# Patient Record
Sex: FEMALE | Employment: UNEMPLOYED | URBAN - METROPOLITAN AREA
[De-identification: names, ages, dates, MRNs, and addresses within clinical notes are randomized per-mention and may not be internally consistent; named-entity substitution may affect disease eponyms.]

---

## 2018-08-02 LAB
CREATININE, EXTERNAL: 0.6
HBA1C MFR BLD HPLC: 4.9 %

## 2022-11-03 ENCOUNTER — OFFICE VISIT (OUTPATIENT)
Dept: INTERNAL MEDICINE CLINIC | Age: 22
End: 2022-11-03
Payer: COMMERCIAL

## 2022-11-03 VITALS
HEIGHT: 61 IN | RESPIRATION RATE: 16 BRPM | DIASTOLIC BLOOD PRESSURE: 50 MMHG | SYSTOLIC BLOOD PRESSURE: 100 MMHG | HEART RATE: 88 BPM | BODY MASS INDEX: 25.79 KG/M2 | WEIGHT: 136.6 LBS | TEMPERATURE: 96.6 F | OXYGEN SATURATION: 99 %

## 2022-11-03 DIAGNOSIS — J45.20 MILD INTERMITTENT ASTHMA WITHOUT COMPLICATION: ICD-10-CM

## 2022-11-03 DIAGNOSIS — F41.9 ANXIETY: ICD-10-CM

## 2022-11-03 DIAGNOSIS — Z23 ENCOUNTER FOR IMMUNIZATION: Primary | ICD-10-CM

## 2022-11-03 DIAGNOSIS — G56.03 BILATERAL CARPAL TUNNEL SYNDROME: ICD-10-CM

## 2022-11-03 DIAGNOSIS — F07.81 POST CONCUSSIVE SYNDROME: ICD-10-CM

## 2022-11-03 PROCEDURE — 90686 IIV4 VACC NO PRSV 0.5 ML IM: CPT | Performed by: INTERNAL MEDICINE

## 2022-11-03 PROCEDURE — 90471 IMMUNIZATION ADMIN: CPT | Performed by: INTERNAL MEDICINE

## 2022-11-03 PROCEDURE — 99203 OFFICE O/P NEW LOW 30 MIN: CPT | Performed by: INTERNAL MEDICINE

## 2022-11-03 RX ORDER — OMEGA-3-ACID ETHYL ESTERS 1 G/1
2 CAPSULE, LIQUID FILLED ORAL 2 TIMES DAILY
COMMUNITY

## 2022-11-03 RX ORDER — CHOLECALCIFEROL (VITAMIN D3) 50 MCG
1 CAPSULE ORAL DAILY
COMMUNITY

## 2022-11-03 RX ORDER — HYDROXYZINE 25 MG/1
25 TABLET, FILM COATED ORAL
Qty: 30 TABLET | Refills: 3 | Status: SHIPPED | OUTPATIENT
Start: 2022-11-03 | End: 2022-11-13

## 2022-11-03 RX ORDER — BISMUTH SUBSALICYLATE 262 MG
1 TABLET,CHEWABLE ORAL 2 TIMES DAILY
COMMUNITY

## 2022-11-03 RX ORDER — VENLAFAXINE 37.5 MG/1
TABLET ORAL DAILY
COMMUNITY
Start: 2022-10-19

## 2022-11-03 RX ORDER — MULTIVIT WITH MINERALS/HERBS
1 TABLET ORAL DAILY
COMMUNITY

## 2022-11-03 RX ORDER — MONTELUKAST SODIUM 10 MG/1
10 TABLET ORAL DAILY
Qty: 30 TABLET | Refills: 3
Start: 2022-11-03

## 2022-11-03 RX ORDER — MAGNESIUM CHLORIDE 70 MG
144 TABLET, DELAYED RELEASE (ENTERIC COATED) ORAL 2 TIMES DAILY
COMMUNITY

## 2022-11-03 NOTE — PROGRESS NOTES
Sharon Turner  is a 23 y.o. female  who present for routine immunizations. Prior to vaccine administration: Consent was obtained. Risks and adverse reactions were discussed. The patient was provided the VIS and they were given an opportunity to ask questions; all questions were addressed. She  denies any symptoms, reactions or allergies that would exclude them from being immunized today. There were no adverse reactions observed post vaccination. Patient was advised to seek medical or call the office with any questions or concerns post vaccination. Patient verbalized understanding.    Nancy Carpio LPN

## 2022-11-03 NOTE — ASSESSMENT & PLAN NOTE
Dx 2021, had steroid shots that helped for a time but then symptoms came back  Established with ortho hand specialist who is recommending surgery  Dr Jn Mckeon

## 2022-11-03 NOTE — ASSESSMENT & PLAN NOTE
Concussion in 2012 and has had lingering effects including headaches  Adding supplements has been helpful  She still gets headaches but less often  Had seen neuro and does not have migraines

## 2022-11-03 NOTE — PROGRESS NOTES
11/3/2022    Amira De La Rosa 2000 is a 23y.o. year old female new patient,   here for evaluation of the following chief complaint(s):  Chief Complaint   Patient presents with    Establish Care           ASSESSMENT/PLAN:  Below is the assessment and plan developed based on review of pertinent history, physical exam, labs, studies, and medications. 1. Post concussive syndrome  Assessment & Plan:  Concussion in 2012 and has had lingering effects including headaches  Adding supplements has been helpful  She still gets headaches but less often  Had seen neuro and does not have migraines  2. Bilateral carpal tunnel syndrome  Assessment & Plan:  Dx 2021, had steroid shots that helped for a time but then symptoms came back  Established with ortho hand specialist who is recommending surgery  Dr Jackline Skiff  3. Anxiety  Assessment & Plan:  Was put on venlafaxine for dual benefit with migraine prevention, hoping to taper off at this time. Feels she has good coping mechanisms for work related stress. Rare use of hydroxyzine, to get to sleep  Discussed plan to taper off venlafaxine slowly, start with 1/2 tab for 2-3 weeks, then quarter or every other day for several weeks  Contact me if any issues with tapering  Orders:  -     hydrOXYzine HCL (ATARAX) 25 mg tablet; Take 1 Tablet by mouth nightly as needed for Sleep for up to 10 days. , Normal, Disp-30 Tablet, R-3  4. Mild intermittent asthma without complication  Assessment & Plan:  Controlled with singulair, doesn't require inhaler  Orders:  -     montelukast (SINGULAIR) 10 mg tablet; Take 1 Tablet by mouth daily. , No Print, Disp-30 Tablet, R-3         Follow-up and Dispositions    Return in about 6 months (around 5/3/2023) for annual physical, or sooner as needed.             SUBJECTIVE/OBJECTIVE:  New patient to me, here to establish care  Visit spent in discussion of past and current medical conditions as documented in assessment and plan         Review of Systems   Constitutional:  Negative for chills and fever. Respiratory:  Negative for cough and shortness of breath. Cardiovascular:  Negative for chest pain, palpitations and leg swelling. Gastrointestinal:  Negative for abdominal pain. Skin:  Negative for rash. Neurological:  Positive for tingling (bilateral CTS) and headaches (chronic, improved from prior). Psychiatric/Behavioral:  The patient is nervous/anxious (mild). Physical Exam  Constitutional:       General: She is not in acute distress. Appearance: Normal appearance. She is not ill-appearing. HENT:      Head: Normocephalic and atraumatic. Eyes:      Conjunctiva/sclera: Conjunctivae normal.   Pulmonary:      Effort: Pulmonary effort is normal.   Skin:     General: Skin is warm and dry. Findings: No rash. Neurological:      General: No focal deficit present. Mental Status: She is alert. Psychiatric:         Mood and Affect: Mood normal.         Behavior: Behavior normal.         Thought Content: Thought content normal.         Judgment: Judgment normal.        Vitals:    11/03/22 1456   BP: (!) 100/50   Pulse: 88   Resp: 16   Temp: (!) 96.6 °F (35.9 °C)   TempSrc: Temporal   SpO2: 99%   Weight: 136 lb 9.6 oz (62 kg)   Height: 5' 1.25\" (1.556 m)        The following sections were reviewed & updated as appropriate: Problem List, Allergies, PMH, PSH, FH, and SH. Patient Active Problem List   Diagnosis Code    Post concussive syndrome F07.81    Anxiety F41.9    Bilateral carpal tunnel syndrome G56.03    Mild intermittent asthma J45.20        Current Outpatient Medications   Medication Sig Dispense Refill    venlafaxine (EFFEXOR) 37.5 mg tablet daily. B.animalis,bifid,infantis,long (Probiotic 4X) 10-15 mg TbEC Take 1 Capsule by mouth daily. b complex vitamins tablet Take 1 Tablet by mouth daily. multivitamin (ONE A DAY) tablet Take 1 Tablet by mouth two (2) times a day.       omega-3 acid ethyl esters (LOVAZA) 1 gram capsule Take 2 g by mouth two (2) times a day. magnesium chloride (MAG DELAY) 64 mg delayed release tablet Take 144 mg by mouth two (2) times a day.      hydrOXYzine HCL (ATARAX) 25 mg tablet Take 1 Tablet by mouth nightly as needed for Sleep for up to 10 days. 30 Tablet 3    montelukast (SINGULAIR) 10 mg tablet Take 1 Tablet by mouth daily. 30 Tablet 3       Patient has no known allergies. Social History     Occupational History    Not on file   Tobacco Use    Smoking status: Never    Smokeless tobacco: Never   Substance and Sexual Activity    Alcohol use: No    Drug use: No    Sexual activity: Never        On this date 11/03/2022 I have spent 30 minutes reviewing previous notes, test results and face to face with the patient discussing the diagnosis and importance of compliance with the treatment plan as well as documenting on the day of the visit. Disclaimer:  Aspects of this note may have been generated using Dragon voice recognition software. Despite editing, there may be some syntax errors   We discussed the expected course, resolution and complications of the diagnosis(es) in detail. I have discussed any significant medication side effects and warnings with the patient when indicated. I advised them to contact the office if their condition worsens, changes or fails to improve as anticipated. Patient expressed understanding of the diagnosis and plan. An electronic signature was used to authenticate this note.   -- Itzel Ceballos MD

## 2022-11-03 NOTE — ASSESSMENT & PLAN NOTE
Was put on venlafaxine for dual benefit with migraine prevention, hoping to taper off at this time. Feels she has good coping mechanisms for work related stress.   Rare use of hydroxyzine, to get to sleep  Discussed plan to taper off venlafaxine slowly, start with 1/2 tab for 2-3 weeks, then quarter or every other day for several weeks  Contact me if any issues with tapering

## 2023-01-10 ENCOUNTER — VIRTUAL VISIT (OUTPATIENT)
Dept: INTERNAL MEDICINE CLINIC | Age: 23
End: 2023-01-10
Payer: COMMERCIAL

## 2023-01-10 DIAGNOSIS — F41.9 ANXIETY: Primary | ICD-10-CM

## 2023-01-10 PROCEDURE — 99214 OFFICE O/P EST MOD 30 MIN: CPT | Performed by: INTERNAL MEDICINE

## 2023-01-10 RX ORDER — BUSPIRONE HYDROCHLORIDE 10 MG/1
10 TABLET ORAL 2 TIMES DAILY
Qty: 60 TABLET | Refills: 2 | Status: SHIPPED | OUTPATIENT
Start: 2023-01-10

## 2023-01-10 RX ORDER — HYDROXYZINE 25 MG/1
TABLET, FILM COATED ORAL
COMMUNITY
Start: 2023-01-07

## 2023-01-10 NOTE — PROGRESS NOTES
1/10/2023    Tod Daughters 2000 is a 23y.o. year old female established patient,   here for video visit to evaluate the following chief complaint(s):  Chief Complaint   Patient presents with    Anxiety             ASSESSMENT/PLAN:  Below is the assessment and plan developed based on review of pertinent history, physical exam, labs, studies, and medications. 1. Anxiety  Assessment & Plan:   Patient with persistent anxiety that is interfering with daily life  We discussed risks and benefits of starting buspirone/Buspar therapy and she wishes to proceed. Potential side effects were discussed  Patient instructed to contact me regarding any adverse side effects, worsening in mood or sleep, or before they plan to discontinue the medication  I recommend looking into concurrent therapy/counseling as well, and I advised the patient to check with their employer or insurance company to see about their mental health benefits or providers in their network. Orders:  -     busPIRone (BUSPAR) 10 mg tablet; Take 1 Tablet by mouth two (2) times a day., Normal, Disp-60 Tablet, R-2       Follow-up and Dispositions    Return in about 4 weeks (around 2/7/2023) for video visit. SUBJECTIVE/OBJECTIVE:  Anxiety    Since last week she has tapered off the venlafaxine. It had caused some nausea and other issues if not taking exactly the same time each day. Her headaches had improved. Was previously on it for mood and also headache management. Now since being off she notes some return of anxiety. She has had more racing thoughts, mood volatility  She had tried zoloft many years ago but didn't tolerate well,wondering about other options    Review of Systems   Constitutional:  Negative for chills and fever. HENT:          Globus sensation she associates with anxiety   Respiratory:  Negative for cough and shortness of breath. Cardiovascular:  Negative for chest pain, palpitations and leg swelling. Gastrointestinal:  Negative for abdominal pain. Skin:  Negative for rash. Psychiatric/Behavioral:  Negative for depression. The patient is nervous/anxious. The patient does not have insomnia. Constitutional: [x] Appears well-developed and well-nourished [x] No apparent distress      [] Abnormal -     Mental status: [x] Alert and awake  [x] Oriented to person/place/time [x] Able to follow commands    [] Abnormal -     Eyes:   EOM    [x]  Normal    [] Abnormal -   Sclera  [x]  Normal    [] Abnormal -          Discharge [x]  None visible   [] Abnormal -     HENT: [x] Normocephalic, atraumatic  [] Abnormal -   [x] Mouth/Throat: Mucous membranes are moist    External Ears [x] Normal  [] Abnormal -    Neck: [x] No visualized mass [] Abnormal -     Pulmonary/Chest: [x] Respiratory effort normal   [x] No visualized signs of difficulty breathing or respiratory distress        [] Abnormal -        Neurological:        [x] No Facial Asymmetry (Cranial nerve 7 motor function) (limited exam due to video visit)          [x] No gaze palsy        [] Abnormal -          Skin:        [x] No significant exanthematous lesions or discoloration noted on facial skin         [] Abnormal -            Psychiatric:       [x] Normal Affect [] Abnormal -              Patient-Reported Vitals 1/10/2023   Patient-Reported Weight 133   Patient-Reported Pulse 90 bpm   Patient-Reported LMP 12/18/2022          The following sections were reviewed & updated as appropriate: Problem List, Allergies, PMH, PSH, FH, and SH. Patient Active Problem List   Diagnosis Code    Post concussive syndrome F07.81    Anxiety F41.9    Bilateral carpal tunnel syndrome G56.03    Mild intermittent asthma J45.20        Current Outpatient Medications   Medication Sig Dispense Refill    hydrOXYzine HCL (ATARAX) 25 mg tablet daily as needed. busPIRone (BUSPAR) 10 mg tablet Take 1 Tablet by mouth two (2) times a day.  60 Tablet 2 B.animalis,bifid,infantis,long (Probiotic 4X) 10-15 mg TbEC Take 1 Capsule by mouth daily. b complex vitamins tablet Take 1 Tablet by mouth daily. multivitamin (ONE A DAY) tablet Take 1 Tablet by mouth two (2) times a day. omega-3 acid ethyl esters (LOVAZA) 1 gram capsule Take 2 g by mouth two (2) times a day. magnesium chloride (MAG DELAY) 64 mg delayed release tablet Take 144 mg by mouth two (2) times a day. montelukast (SINGULAIR) 10 mg tablet Take 1 Tablet by mouth daily. 30 Tablet 3    INTRAUTERINE DEVICE, IUD, IU by IntraUTERine route. Patient has no known allergies. Social History     Occupational History    Not on file   Tobacco Use    Smoking status: Never    Smokeless tobacco: Never   Substance and Sexual Activity    Alcohol use: No    Drug use: No    Sexual activity: Never                Time spent on telemed visit: 20 min    The patient was evaluated through a synchronous (real-time) audio-video encounter. The patient (or guardian if applicable) is aware that this is a billable service, which includes applicable co-pays. Verbal consent to proceed has been obtained. The visit was conducted pursuant to the emergency declaration under the [facility], [address] [place] [address] authority and the [place] and [place] General Act. Patient identification was verified, and a caregiver was present when appropriate. The patient was located at home in a state where the provider was licensed to provide care. Disclaimer:  Aspects of this note may have been generated using Dragon voice recognition software. Despite editing, there may be some syntax errors   We discussed the expected course, resolution and complications of the diagnosis(es) in detail. I have discussed any significant medication side effects and warnings with the patient when indicated.   I advised them to contact the office if their condition worsens, changes or fails to improve as anticipated. Patient expressed understanding of the diagnosis and plan. An electronic signature was used to authenticate this note.   -- Chiquita Cabrera MD

## 2023-01-10 NOTE — ASSESSMENT & PLAN NOTE
Patient with persistent anxiety that is interfering with daily life  We discussed risks and benefits of starting buspirone/Buspar therapy and she wishes to proceed. Potential side effects were discussed  Patient instructed to contact me regarding any adverse side effects, worsening in mood or sleep, or before they plan to discontinue the medication  I recommend looking into concurrent therapy/counseling as well, and I advised the patient to check with their employer or insurance company to see about their mental health benefits or providers in their network.

## 2023-02-07 ENCOUNTER — VIRTUAL VISIT (OUTPATIENT)
Dept: INTERNAL MEDICINE CLINIC | Age: 23
End: 2023-02-07
Payer: COMMERCIAL

## 2023-02-07 DIAGNOSIS — F41.9 ANXIETY: ICD-10-CM

## 2023-02-07 PROCEDURE — 99213 OFFICE O/P EST LOW 20 MIN: CPT | Performed by: INTERNAL MEDICINE

## 2023-02-07 NOTE — PROGRESS NOTES
2/7/2023    Toy Medal 2000 is a 20y.o. year old female established patient,   here for video visit to evaluate the following chief complaint(s):  Chief Complaint   Patient presents with    Anxiety           ASSESSMENT/PLAN:  Below is the assessment and plan developed based on review of pertinent history, physical exam, labs, studies, and medications. 1. Anxiety  Assessment & Plan:  Kristin is working well for anxiety symptoms and no significant adverse effects noted, will continue  Encouraged her to keep looking into therapy/counseling options  Advised on not discontinuing meds without discussing with me  Contact me if any significant worsening in mood or sleep       Follow-up and Dispositions    Return in about 4 months (around 6/7/2023) for annual physical, or sooner as needed. SUBJECTIVE/OBJECTIVE:  Anxiety follow up  Previously discontinued venlafaxine and then started on buspar last month  Overall feels buspar is well tolerated and working well to manage anxiety symptoms  No obvious adverse effects though feels like sleep is somewhat different in that she will wake up more often in the night as compared to before. She is able to get back to sleep and feels rested in the morning      Review of Systems   Constitutional:  Negative for chills and fever. Respiratory:  Negative for cough and shortness of breath. Cardiovascular:  Negative for chest pain, palpitations and leg swelling. Gastrointestinal:  Negative for abdominal pain. Skin:  Negative for rash. Psychiatric/Behavioral:  Negative for depression and suicidal ideas. The patient is not nervous/anxious.          Constitutional: [x] Appears well-developed and well-nourished [x] No apparent distress      [] Abnormal -     Mental status: [x] Alert and awake  [x] Oriented to person/place/time [x] Able to follow commands    [] Abnormal -     Eyes:   EOM    [x]  Normal    [] Abnormal -   Sclera  [x]  Normal    [] Abnormal - Discharge [x]  None visible   [] Abnormal -     HENT: [x] Normocephalic, atraumatic  [] Abnormal -   [x] Mouth/Throat: Mucous membranes are moist    External Ears [x] Normal  [] Abnormal -    Neck: [x] No visualized mass [] Abnormal -     Pulmonary/Chest: [x] Respiratory effort normal   [x] No visualized signs of difficulty breathing or respiratory distress        [] Abnormal -       Neurological:        [x] No Facial Asymmetry (Cranial nerve 7 motor function) (limited exam due to video visit)          [x] No gaze palsy        [] Abnormal -          Skin:        [x] No significant exanthematous lesions or discoloration noted on facial skin         [] Abnormal -            Psychiatric:       [x] Normal Affect [] Abnormal -              Patient-Reported Vitals 2/7/2023 1/10/2023   Patient-Reported Weight 132 133   Patient-Reported Pulse 75 90 bpm   Patient-Reported LMP 01/17/2023 12/18/2022          The following sections were reviewed & updated as appropriate: Problem List, Allergies, PMH, PSH, FH, and SH. Patient Active Problem List   Diagnosis Code    Post concussive syndrome F07.81    Anxiety F41.9    Bilateral carpal tunnel syndrome G56.03    Mild intermittent asthma J45.20        Current Outpatient Medications   Medication Sig Dispense Refill    hydrOXYzine HCL (ATARAX) 25 mg tablet Take 1 Tablet by mouth daily as needed for Anxiety or Sleep. 30 Tablet 0    busPIRone (BUSPAR) 10 mg tablet Take 1 Tablet by mouth two (2) times a day. 60 Tablet 2    B.animalis,bifid,infantis,long (Probiotic 4X) 10-15 mg TbEC Take 1 Capsule by mouth daily. b complex vitamins tablet Take 1 Tablet by mouth daily. multivitamin (ONE A DAY) tablet Take 1 Tablet by mouth two (2) times a day. omega-3 acid ethyl esters (LOVAZA) 1 gram capsule Take 2 g by mouth two (2) times a day. magnesium chloride (MAG DELAY) 64 mg delayed release tablet Take 144 mg by mouth two (2) times a day.       montelukast (SINGULAIR) 10 mg tablet Take 1 Tablet by mouth daily. 30 Tablet 3    INTRAUTERINE DEVICE, IUD, IU by IntraUTERine route. Patient has no known allergies. Social History     Occupational History    Not on file   Tobacco Use    Smoking status: Never    Smokeless tobacco: Never   Substance and Sexual Activity    Alcohol use: No    Drug use: No    Sexual activity: Never            Time spent on telemed visit: 12 min    The patient was evaluated through a synchronous (real-time) audio-video encounter. The patient (or guardian if applicable) is aware that this is a billable service, which includes applicable co-pays. Verbal consent to proceed has been obtained. The visit was conducted pursuant to the emergency declaration under the [facility], [facility] authority and the [place] and [employer] General Act. Patient identification was verified, and a caregiver was present when appropriate. The patient was located at home in a state where the provider was licensed to provide care. Disclaimer:  Aspects of this note may have been generated using Dragon voice recognition software. Despite editing, there may be some syntax errors   We discussed the expected course, resolution and complications of the diagnosis(es) in detail. I have discussed any significant medication side effects and warnings with the patient when indicated. I advised them to contact the office if their condition worsens, changes or fails to improve as anticipated. Patient expressed understanding of the diagnosis and plan. An electronic signature was used to authenticate this note.   -- Kirstin Patel MD

## 2023-02-07 NOTE — ASSESSMENT & PLAN NOTE
Buspar is working well for anxiety symptoms and no significant adverse effects noted, will continue  Encouraged her to keep looking into therapy/counseling options  Advised on not discontinuing meds without discussing with me  Contact me if any significant worsening in mood or sleep

## 2023-03-19 ENCOUNTER — PATIENT MESSAGE (OUTPATIENT)
Dept: INTERNAL MEDICINE CLINIC | Age: 23
End: 2023-03-19

## 2023-03-19 DIAGNOSIS — J45.20 MILD INTERMITTENT ASTHMA WITHOUT COMPLICATION: ICD-10-CM

## 2023-03-19 DIAGNOSIS — F41.9 ANXIETY: ICD-10-CM

## 2023-03-21 RX ORDER — MONTELUKAST SODIUM 10 MG/1
10 TABLET ORAL DAILY
Qty: 90 TABLET | Refills: 3 | Status: SHIPPED | OUTPATIENT
Start: 2023-03-21

## 2023-03-21 RX ORDER — BUSPIRONE HYDROCHLORIDE 10 MG/1
10 TABLET ORAL 2 TIMES DAILY
Qty: 60 TABLET | Refills: 2 | Status: SHIPPED | OUTPATIENT
Start: 2023-03-21

## 2023-05-17 RX ORDER — HYDROXYZINE HYDROCHLORIDE 25 MG/1
TABLET, FILM COATED ORAL
COMMUNITY
Start: 2023-04-04

## 2023-05-17 RX ORDER — MAGNESIUM CHLORIDE 64 MG
144 TABLET, DELAYED RELEASE (ENTERIC COATED) ORAL 2 TIMES DAILY
COMMUNITY

## 2023-05-17 RX ORDER — BUSPIRONE HYDROCHLORIDE 10 MG/1
10 TABLET ORAL 2 TIMES DAILY
COMMUNITY
Start: 2023-03-21 | End: 2023-06-23 | Stop reason: SDUPTHER

## 2023-05-17 RX ORDER — CHLORAL HYDRATE 500 MG
2 CAPSULE ORAL 2 TIMES DAILY
COMMUNITY

## 2023-05-17 RX ORDER — MONTELUKAST SODIUM 10 MG/1
10 TABLET ORAL DAILY
COMMUNITY
Start: 2023-03-21

## 2023-06-23 ENCOUNTER — OFFICE VISIT (OUTPATIENT)
Age: 23
End: 2023-06-23
Payer: COMMERCIAL

## 2023-06-23 VITALS
WEIGHT: 131.4 LBS | HEIGHT: 61 IN | SYSTOLIC BLOOD PRESSURE: 114 MMHG | HEART RATE: 98 BPM | BODY MASS INDEX: 24.81 KG/M2 | OXYGEN SATURATION: 100 % | TEMPERATURE: 97.8 F | DIASTOLIC BLOOD PRESSURE: 76 MMHG | RESPIRATION RATE: 18 BRPM

## 2023-06-23 DIAGNOSIS — G56.03 BILATERAL CARPAL TUNNEL SYNDROME: ICD-10-CM

## 2023-06-23 DIAGNOSIS — F41.9 ANXIETY: ICD-10-CM

## 2023-06-23 DIAGNOSIS — Z11.59 NEED FOR HEPATITIS C SCREENING TEST: ICD-10-CM

## 2023-06-23 DIAGNOSIS — E55.9 VITAMIN D DEFICIENCY: ICD-10-CM

## 2023-06-23 DIAGNOSIS — Z00.00 ROUTINE PHYSICAL EXAMINATION: Primary | ICD-10-CM

## 2023-06-23 DIAGNOSIS — Z23 NEED FOR TDAP VACCINATION: ICD-10-CM

## 2023-06-23 DIAGNOSIS — Z11.3 SCREENING FOR STD (SEXUALLY TRANSMITTED DISEASE): ICD-10-CM

## 2023-06-23 PROCEDURE — 90471 IMMUNIZATION ADMIN: CPT | Performed by: INTERNAL MEDICINE

## 2023-06-23 PROCEDURE — 90715 TDAP VACCINE 7 YRS/> IM: CPT | Performed by: INTERNAL MEDICINE

## 2023-06-23 PROCEDURE — 99395 PREV VISIT EST AGE 18-39: CPT | Performed by: INTERNAL MEDICINE

## 2023-06-23 RX ORDER — BUSPIRONE HYDROCHLORIDE 10 MG/1
10 TABLET ORAL 2 TIMES DAILY
Qty: 180 TABLET | Refills: 1 | Status: SHIPPED | OUTPATIENT
Start: 2023-06-23

## 2023-06-23 SDOH — ECONOMIC STABILITY: FOOD INSECURITY: WITHIN THE PAST 12 MONTHS, THE FOOD YOU BOUGHT JUST DIDN'T LAST AND YOU DIDN'T HAVE MONEY TO GET MORE.: NEVER TRUE

## 2023-06-23 SDOH — ECONOMIC STABILITY: HOUSING INSECURITY
IN THE LAST 12 MONTHS, WAS THERE A TIME WHEN YOU DID NOT HAVE A STEADY PLACE TO SLEEP OR SLEPT IN A SHELTER (INCLUDING NOW)?: NO

## 2023-06-23 SDOH — ECONOMIC STABILITY: INCOME INSECURITY: HOW HARD IS IT FOR YOU TO PAY FOR THE VERY BASICS LIKE FOOD, HOUSING, MEDICAL CARE, AND HEATING?: NOT HARD AT ALL

## 2023-06-23 SDOH — ECONOMIC STABILITY: FOOD INSECURITY: WITHIN THE PAST 12 MONTHS, YOU WORRIED THAT YOUR FOOD WOULD RUN OUT BEFORE YOU GOT MONEY TO BUY MORE.: NEVER TRUE

## 2023-06-23 ASSESSMENT — ENCOUNTER SYMPTOMS
CONSTIPATION: 0
VOMITING: 0
DIARRHEA: 0
EYES NEGATIVE: 1
NAUSEA: 0
COUGH: 0
SHORTNESS OF BREATH: 0
ABDOMINAL PAIN: 0

## 2023-06-23 ASSESSMENT — PATIENT HEALTH QUESTIONNAIRE - PHQ9
SUM OF ALL RESPONSES TO PHQ QUESTIONS 1-9: 0
SUM OF ALL RESPONSES TO PHQ9 QUESTIONS 1 & 2: 0
2. FEELING DOWN, DEPRESSED OR HOPELESS: 0
SUM OF ALL RESPONSES TO PHQ QUESTIONS 1-9: 0
1. LITTLE INTEREST OR PLEASURE IN DOING THINGS: 0
SUM OF ALL RESPONSES TO PHQ QUESTIONS 1-9: 0
SUM OF ALL RESPONSES TO PHQ QUESTIONS 1-9: 0

## 2023-06-23 NOTE — PROGRESS NOTES
Chief Complaint   Patient presents with    Annual Exam     Blood pressure 114/76, pulse 98, temperature 97.8 °F (36.6 °C), temperature source Temporal, resp. rate 18, height 5' 1.3\" (1.557 m), weight 131 lb 6.4 oz (59.6 kg), last menstrual period 06/16/2023, SpO2 100 %.

## 2023-06-23 NOTE — ASSESSMENT & PLAN NOTE
Had surgery 2022  Dr Margarito Santiago  Subsequently developed some right shoulder aches, working with a chiropractor now

## 2023-06-23 NOTE — PROGRESS NOTES
6/23/2023    Maddie Casanova is a 20 y.o. female who was seen in clinic today for a full physical.             Assessment & Plan:   Below is the assessment and plan developed based on review of pertinent history, physical exam, labs, studies, and medications. 1. Routine physical examination  -     Lipid Panel; Future  -     TSH; Future  -     Comprehensive Metabolic Panel; Future  -     CBC with Auto Differential; Future  2. Bilateral carpal tunnel syndrome  Assessment & Plan:  Had surgery 2022  Dr Miquel Barry  Subsequently developed some right shoulder aches, working with a chiropractor now  3. Anxiety  Assessment & Plan:   Mood symptoms controlled on current regimen and no adverse effects noted, plan to continue   Orders:  -     busPIRone (BUSPAR) 10 MG tablet; Take 1 tablet by mouth 2 times daily, Disp-180 tablet, R-1Normal  4. Vitamin D deficiency  -     Vitamin D 25 Hydroxy; Future  5. Need for hepatitis C screening test  -     Hepatitis C Ab, Rflx to Qt by PCR; Future  6. Screening for STD (sexually transmitted disease)  -     HIV 1/2 Ag/Ab, 4TH Generation,W Rflx Confirm; Future  7. Need for Tdap vaccination  -     Tdap, BOOSTRIX, (age 8 yrs+), IM       Return in about 6 months (around 12/23/2023) for chronic follow up.       Galileo Helms is here today for a full physical.        Diet and exercise habits: balanced diet, goes walking daily      Depression Screen:  PHQ-9 Total Score: 0 (6/23/2023  2:51 PM)       Health Maintenance:     Health Maintenance   Topic Date Due    COVID-19 Vaccine (1) Never done    Pneumococcal 0-64 years Vaccine (1 - PCV) Never done    HIV screen  Never done    Hepatitis C screen  Never done    DTaP/Tdap/Td vaccine (7 - Td or Tdap) 08/17/2019    HPV vaccine (2 - 3-dose series) 04/08/2022    Depression Screen  06/23/2024    Pap smear  09/15/2024    Hepatitis A vaccine  Completed    Hib vaccine  Completed    Varicella vaccine  Completed    Flu vaccine  Completed    Meningococcal

## 2023-07-06 DIAGNOSIS — E55.9 VITAMIN D DEFICIENCY: ICD-10-CM

## 2023-07-06 DIAGNOSIS — Z11.3 SCREENING FOR STD (SEXUALLY TRANSMITTED DISEASE): ICD-10-CM

## 2023-07-06 DIAGNOSIS — Z00.00 ROUTINE PHYSICAL EXAMINATION: ICD-10-CM

## 2023-07-06 DIAGNOSIS — Z11.59 NEED FOR HEPATITIS C SCREENING TEST: ICD-10-CM

## 2023-07-06 LAB
25(OH)D3 SERPL-MCNC: 36.3 NG/ML (ref 30–100)
ALBUMIN SERPL-MCNC: 4.2 G/DL (ref 3.5–5)
ALBUMIN/GLOB SERPL: 1.5 (ref 1.1–2.2)
ALP SERPL-CCNC: 60 U/L (ref 45–117)
ALT SERPL-CCNC: 22 U/L (ref 12–78)
ANION GAP SERPL CALC-SCNC: 5 MMOL/L (ref 5–15)
AST SERPL-CCNC: 15 U/L (ref 15–37)
BASOPHILS # BLD: 0.1 K/UL (ref 0–0.1)
BASOPHILS NFR BLD: 1 % (ref 0–1)
BILIRUB SERPL-MCNC: 0.7 MG/DL (ref 0.2–1)
BUN SERPL-MCNC: 11 MG/DL (ref 6–20)
BUN/CREAT SERPL: 15 (ref 12–20)
CALCIUM SERPL-MCNC: 9.3 MG/DL (ref 8.5–10.1)
CHLORIDE SERPL-SCNC: 106 MMOL/L (ref 97–108)
CHOLEST SERPL-MCNC: 132 MG/DL
CO2 SERPL-SCNC: 28 MMOL/L (ref 21–32)
CREAT SERPL-MCNC: 0.73 MG/DL (ref 0.55–1.02)
DIFFERENTIAL METHOD BLD: NORMAL
EOSINOPHIL # BLD: 0.1 K/UL (ref 0–0.4)
EOSINOPHIL NFR BLD: 2 % (ref 0–7)
ERYTHROCYTE [DISTWIDTH] IN BLOOD BY AUTOMATED COUNT: 12.9 % (ref 11.5–14.5)
GLOBULIN SER CALC-MCNC: 2.8 G/DL (ref 2–4)
GLUCOSE SERPL-MCNC: 96 MG/DL (ref 65–100)
HCT VFR BLD AUTO: 43.6 % (ref 35–47)
HDLC SERPL-MCNC: 49 MG/DL
HDLC SERPL: 2.7 (ref 0–5)
HGB BLD-MCNC: 14 G/DL (ref 11.5–16)
HIV 1+2 AB+HIV1 P24 AG SERPL QL IA: NONREACTIVE
HIV 1/2 RESULT COMMENT: NORMAL
IMM GRANULOCYTES # BLD AUTO: 0 K/UL
IMM GRANULOCYTES NFR BLD AUTO: 0 %
LDLC SERPL CALC-MCNC: 70.2 MG/DL (ref 0–100)
LYMPHOCYTES # BLD: 1.7 K/UL (ref 0.8–3.5)
LYMPHOCYTES NFR BLD: 33 % (ref 12–49)
MCH RBC QN AUTO: 30 PG (ref 26–34)
MCHC RBC AUTO-ENTMCNC: 32.1 G/DL (ref 30–36.5)
MCV RBC AUTO: 93.6 FL (ref 80–99)
MONOCYTES # BLD: 0.4 K/UL (ref 0–1)
MONOCYTES NFR BLD: 7 % (ref 5–13)
NEUTS SEG # BLD: 2.9 K/UL (ref 1.8–8)
NEUTS SEG NFR BLD: 57 % (ref 32–75)
NRBC # BLD: 0 K/UL (ref 0–0.01)
NRBC BLD-RTO: 0 PER 100 WBC
PLATELET # BLD AUTO: 233 K/UL (ref 150–400)
PMV BLD AUTO: 11.7 FL (ref 8.9–12.9)
POTASSIUM SERPL-SCNC: 4.3 MMOL/L (ref 3.5–5.1)
PROT SERPL-MCNC: 7 G/DL (ref 6.4–8.2)
RBC # BLD AUTO: 4.66 M/UL (ref 3.8–5.2)
RBC MORPH BLD: NORMAL
SODIUM SERPL-SCNC: 139 MMOL/L (ref 136–145)
TRIGL SERPL-MCNC: 64 MG/DL
TSH SERPL DL<=0.05 MIU/L-ACNC: 1.21 UIU/ML (ref 0.36–3.74)
VLDLC SERPL CALC-MCNC: 12.8 MG/DL
WBC # BLD AUTO: 5.2 K/UL (ref 3.6–11)

## 2023-07-07 LAB
HCV AB SERPL QL IA: NORMAL
HCV IGG SERPL QL IA: NON REACTIVE S/CO RATIO

## 2023-10-01 ENCOUNTER — PATIENT MESSAGE (OUTPATIENT)
Age: 23
End: 2023-10-01

## 2023-10-01 DIAGNOSIS — F41.9 ANXIETY: Primary | ICD-10-CM

## 2023-10-02 RX ORDER — HYDROXYZINE HYDROCHLORIDE 25 MG/1
25 TABLET, FILM COATED ORAL EVERY 8 HOURS PRN
Qty: 30 TABLET | Refills: 1 | Status: SHIPPED | OUTPATIENT
Start: 2023-10-02

## 2023-10-02 NOTE — TELEPHONE ENCOUNTER
From: Vincent Howard  To: Dr. Jessica Sanderson: 10/1/2023 9:27 PM EDT  Subject: Hydroxyzine     Hi Dr. Lico Freire, I noticed I'm running low on my 25mg hydroxyzine that I use as needed. Is it possible to have a refill scheduled? Thank you!

## 2023-10-27 ENCOUNTER — OFFICE VISIT (OUTPATIENT)
Age: 23
End: 2023-10-27

## 2023-10-27 VITALS
TEMPERATURE: 97.8 F | WEIGHT: 129.4 LBS | SYSTOLIC BLOOD PRESSURE: 105 MMHG | HEIGHT: 61 IN | OXYGEN SATURATION: 99 % | BODY MASS INDEX: 24.43 KG/M2 | HEART RATE: 69 BPM | DIASTOLIC BLOOD PRESSURE: 72 MMHG | RESPIRATION RATE: 16 BRPM

## 2023-10-27 DIAGNOSIS — R39.89 BLADDER PAIN: Primary | ICD-10-CM

## 2023-10-27 DIAGNOSIS — F41.9 ANXIETY: ICD-10-CM

## 2023-10-27 DIAGNOSIS — R39.89 BLADDER PAIN: ICD-10-CM

## 2023-10-27 PROBLEM — J45.20 MILD INTERMITTENT ASTHMA: Status: ACTIVE | Noted: 2022-11-03

## 2023-10-27 LAB
BILIRUBIN, URINE, POC: NEGATIVE
BLOOD URINE, POC: NORMAL
GLUCOSE URINE, POC: NEGATIVE
KETONES, URINE, POC: NEGATIVE
LEUKOCYTE ESTERASE, URINE, POC: NORMAL
NITRITE, URINE, POC: NEGATIVE
PH, URINE, POC: 6.5 (ref 4.6–8)
PROTEIN,URINE, POC: NEGATIVE
SPECIFIC GRAVITY, URINE, POC: 1.01 (ref 1–1.03)
URINALYSIS CLARITY, POC: CLEAR
URINALYSIS COLOR, POC: NORMAL
UROBILINOGEN, POC: NORMAL

## 2023-10-27 RX ORDER — BUSPIRONE HYDROCHLORIDE 15 MG/1
15 TABLET ORAL 2 TIMES DAILY
Qty: 180 TABLET | Refills: 1 | Status: SHIPPED | OUTPATIENT
Start: 2023-10-27

## 2023-10-27 ASSESSMENT — PATIENT HEALTH QUESTIONNAIRE - PHQ9
SUM OF ALL RESPONSES TO PHQ QUESTIONS 1-9: 2
1. LITTLE INTEREST OR PLEASURE IN DOING THINGS: 1
SUM OF ALL RESPONSES TO PHQ9 QUESTIONS 1 & 2: 2
SUM OF ALL RESPONSES TO PHQ QUESTIONS 1-9: 2
2. FEELING DOWN, DEPRESSED OR HOPELESS: 1

## 2023-10-27 ASSESSMENT — ENCOUNTER SYMPTOMS
COUGH: 0
SHORTNESS OF BREATH: 0
NAUSEA: 0

## 2023-10-27 NOTE — PROGRESS NOTES
10/27/2023    Socorro Vega 2000 is a 20y.o. year old female established patient,   here for evaluation of the following chief complaint(s):  Chief Complaint   Patient presents with    Pelvic Pain           ASSESSMENT/PLAN:  Below is the assessment and plan developed based on review of pertinent history, physical exam, labs, studies, and medications. 1. Bladder pain  -     Culture, Urine; Future  -     AMB POC URINALYSIS DIP STICK AUTO W/ MICRO  2. Anxiety  Assessment & Plan:   Doing well with 15mg BID, will continue  Orders:  -     busPIRone (BUSPAR) 15 MG tablet; Take 15 mg by mouth 2 times daily, Disp-180 tablet, R-1Normal     UA dipstick today: 1+ blood and trace leuks, no nitrate  Poss infection vs IC vs stone  Advised on drinking lots of water, monitoring urine  Current symptoms are not acute so will await culture before starting antibiotics but advised on contacting us if any worsening symptoms over the weekend  Nathanelda Marcin to try Azo, cranberry/probitoic supplements  If culture negative, will repeat UA next week  She is not on her period so should not expect blood as contaminant    Return if symptoms worsen or fail to improve. SUBJECTIVE/OBJECTIVE:  About 2 months ago patient started having some pelvic pain, urinary frequency, discharge. Saw obgyn x 2 and had testing but it was OK  She still has urinary frequency over the past month, waking up at night and sometimes lower pelvic pain. Has some low level bladder pressure constantly. Denies constipation  Has cut back on alcohol. Cant think of any exacerbating factors to when this started    Review of Systems   Constitutional:  Negative for chills and fever. Respiratory:  Negative for cough and shortness of breath. Cardiovascular:  Negative for chest pain. Gastrointestinal:  Negative for nausea. Genitourinary:  Positive for frequency and pelvic pain. Negative for hematuria. Skin:  Negative for rash.           Physical Exam  Constitutional:

## 2023-10-28 LAB
BACTERIA SPEC CULT: NORMAL
SERVICE CMNT-IMP: NORMAL

## 2023-10-30 DIAGNOSIS — R31.29 MICROSCOPIC HEMATURIA: Primary | ICD-10-CM

## 2023-11-02 DIAGNOSIS — R31.29 MICROSCOPIC HEMATURIA: ICD-10-CM

## 2023-11-02 LAB — SPECIMEN HOLD: NORMAL

## 2023-11-03 LAB
APPEARANCE UR: CLEAR
BACTERIA URNS QL MICRO: NEGATIVE /HPF
BILIRUB UR QL: NEGATIVE
COLOR UR: NORMAL
EPITH CASTS URNS QL MICRO: NORMAL /LPF
GLUCOSE UR STRIP.AUTO-MCNC: NEGATIVE MG/DL
HGB UR QL STRIP: NEGATIVE
HYALINE CASTS URNS QL MICRO: NORMAL /LPF (ref 0–5)
KETONES UR QL STRIP.AUTO: NEGATIVE MG/DL
LEUKOCYTE ESTERASE UR QL STRIP.AUTO: NEGATIVE
NITRITE UR QL STRIP.AUTO: NEGATIVE
PH UR STRIP: 7.5 (ref 5–8)
PROT UR STRIP-MCNC: NEGATIVE MG/DL
RBC #/AREA URNS HPF: NORMAL /HPF (ref 0–5)
SP GR UR REFRACTOMETRY: 1.01 (ref 1–1.03)
UROBILINOGEN UR QL STRIP.AUTO: 0.2 EU/DL (ref 0.2–1)
WBC URNS QL MICRO: NORMAL /HPF (ref 0–4)

## 2024-01-16 ENCOUNTER — TELEMEDICINE (OUTPATIENT)
Age: 24
End: 2024-01-16
Payer: COMMERCIAL

## 2024-01-16 DIAGNOSIS — F41.9 ANXIETY: ICD-10-CM

## 2024-01-16 DIAGNOSIS — J45.20 MILD INTERMITTENT ASTHMA WITHOUT COMPLICATION: ICD-10-CM

## 2024-01-16 PROCEDURE — 99214 OFFICE O/P EST MOD 30 MIN: CPT | Performed by: INTERNAL MEDICINE

## 2024-01-16 RX ORDER — BUPROPION HYDROCHLORIDE 150 MG/1
150 TABLET ORAL EVERY MORNING
Qty: 30 TABLET | Refills: 1 | Status: SHIPPED | OUTPATIENT
Start: 2024-01-16

## 2024-01-16 RX ORDER — HYDROXYZINE HYDROCHLORIDE 25 MG/1
25 TABLET, FILM COATED ORAL EVERY 8 HOURS PRN
Qty: 30 TABLET | Refills: 1 | Status: SHIPPED | OUTPATIENT
Start: 2024-01-16

## 2024-01-16 RX ORDER — MONTELUKAST SODIUM 5 MG/1
5 TABLET, CHEWABLE ORAL NIGHTLY
Qty: 90 TABLET | Refills: 1 | Status: SHIPPED | OUTPATIENT
Start: 2024-01-16

## 2024-01-16 ASSESSMENT — ENCOUNTER SYMPTOMS
SHORTNESS OF BREATH: 0
COUGH: 0
ABDOMINAL PAIN: 0

## 2024-01-16 NOTE — ASSESSMENT & PLAN NOTE
Suggested addition of wellbutrin along with buspar to address recent symptoms  New med dosing and potential side effects discussed  Follow up with me via mychart with progress in a few weeks

## 2024-01-16 NOTE — ASSESSMENT & PLAN NOTE
Discussed possible mood side effects with singulair, she is willing to try 5mg dose and see if that still controls symptoms

## 2024-01-16 NOTE — PROGRESS NOTES
1/16/2024    Traci Moore 2000 is a 23 y.o. year old female established patient,   here for video visit to evaluate the following chief complaint(s):  Chief Complaint   Patient presents with    Anxiety           ASSESSMENT/PLAN:  Below is the assessment and plan developed based on review of pertinent history, physical exam, labs, studies, and medications.    1. Mild intermittent asthma without complication  Assessment & Plan:   Discussed possible mood side effects with singulair, she is willing to try 5mg dose and see if that still controls symptoms  Orders:  -     montelukast (SINGULAIR) 5 MG chewable tablet; Take 1 tablet by mouth nightly, Disp-90 tablet, R-1Normal  2. Anxiety  Assessment & Plan:   Suggested addition of wellbutrin along with buspar to address recent symptoms  New med dosing and potential side effects discussed  Follow up with me via [facility] with progress in a few weeks  Orders:  -     buPROPion (WELLBUTRIN XL) 150 MG extended release tablet; Take 1 tablet by mouth every morning, Disp-30 tablet, R-1Normal  -     hydrOXYzine HCl (ATARAX) 25 MG tablet; Take 1 tablet by mouth every 8 hours as needed for Anxiety, Disp-30 tablet, R-1Normal         Return in about 4 months (around 5/16/2024) for annual physical,or sooner as needed.       SUBJECTIVE/OBJECTIVE:  Patient wondering about making a change with her anxiety meds  She has been on buspar 15mg BID but she is still getting breakthrough anxiety  Work things will get stuck on her mind, she will have trouble concentrating  Sometimes feeling overwhelmed with her responsibilities and then ends up not getting work done she needs to do  Has hydroxyzine she will take sparingly      Review of Systems   Constitutional:  Negative for chills and fever.   Respiratory:  Negative for cough and shortness of breath.    Cardiovascular:  Negative for chest pain, palpitations and leg swelling.   Gastrointestinal:  Negative for abdominal pain.

## 2024-02-06 ENCOUNTER — PATIENT MESSAGE (OUTPATIENT)
Age: 24
End: 2024-02-06

## 2024-02-06 DIAGNOSIS — F41.9 ANXIETY: ICD-10-CM

## 2024-02-06 RX ORDER — BUSPIRONE HYDROCHLORIDE 15 MG/1
15 TABLET ORAL 2 TIMES DAILY
Qty: 180 TABLET | Refills: 1 | Status: SHIPPED | OUTPATIENT
Start: 2024-02-06

## 2024-02-06 NOTE — TELEPHONE ENCOUNTER
From: Traci Moore  To: Dr. Radha Dudley  Sent: 2/6/2024 7:31 AM EST  Subject: Refill of Buspar 15mg 2x daily    Hi there. I am almost out of my Buspar 15mg. I think I have 3 pills left since I forgot to get a refill at my last appt.     Are you able to send a prescription to [facility] at [address]? I couldn't select that pharmacy from the refills page.

## 2024-02-06 NOTE — TELEPHONE ENCOUNTER
No chief complaint on file.    Last Appointment with Dr. Radha Dudley  1/16/24    Future Appointments   Date Time Provider Department Center   6/28/2024  8:30 AM Radha Dudley MD [facility]

## 2024-03-13 DIAGNOSIS — F41.9 ANXIETY: ICD-10-CM

## 2024-03-13 RX ORDER — BUPROPION HYDROCHLORIDE 150 MG/1
150 TABLET ORAL EVERY MORNING
Qty: 30 TABLET | Refills: 1 | Status: SHIPPED | OUTPATIENT
Start: 2024-03-13

## 2024-04-08 DIAGNOSIS — F41.9 ANXIETY: ICD-10-CM

## 2024-04-08 RX ORDER — BUPROPION HYDROCHLORIDE 150 MG/1
150 TABLET ORAL EVERY MORNING
Qty: 90 TABLET | Refills: 1 | OUTPATIENT
Start: 2024-04-08

## 2024-04-11 DIAGNOSIS — F41.9 ANXIETY: ICD-10-CM

## 2024-04-11 RX ORDER — BUPROPION HYDROCHLORIDE 150 MG/1
150 TABLET ORAL EVERY MORNING
Qty: 30 TABLET | Refills: 0 | Status: SHIPPED | OUTPATIENT
Start: 2024-04-11

## 2024-04-11 NOTE — TELEPHONE ENCOUNTER
Chief Complaint   Patient presents with    Medication Refill     Last Appointment with Dr. Radha Dudley 1/16/24    Future Appointments   Date Time Provider Department Center   6/28/2024  8:30 AM Radha Dudley MD [facility]

## 2024-05-09 DIAGNOSIS — F41.9 ANXIETY: ICD-10-CM

## 2024-05-09 RX ORDER — BUPROPION HYDROCHLORIDE 150 MG/1
150 TABLET ORAL EVERY MORNING
Qty: 30 TABLET | Refills: 1 | Status: SHIPPED | OUTPATIENT
Start: 2024-05-09

## 2024-05-09 NOTE — TELEPHONE ENCOUNTER
Chief Complaint   Patient presents with    Medication Refill     Last Appointment with Dr. Radha Dudley 1/16/24    Future Appointments   Date Time Provider Department Center   6/28/2024  8:30 AM Radha Dudley MD [facility]

## 2024-06-28 ENCOUNTER — OFFICE VISIT (OUTPATIENT)
Age: 24
End: 2024-06-28
Payer: COMMERCIAL

## 2024-06-28 VITALS
SYSTOLIC BLOOD PRESSURE: 119 MMHG | BODY MASS INDEX: 24.14 KG/M2 | DIASTOLIC BLOOD PRESSURE: 74 MMHG | OXYGEN SATURATION: 99 % | RESPIRATION RATE: 18 BRPM | TEMPERATURE: 98 F | HEART RATE: 100 BPM | WEIGHT: 131.2 LBS | HEIGHT: 62 IN

## 2024-06-28 DIAGNOSIS — Z00.00 ROUTINE PHYSICAL EXAMINATION: Primary | ICD-10-CM

## 2024-06-28 DIAGNOSIS — G56.03 BILATERAL CARPAL TUNNEL SYNDROME: ICD-10-CM

## 2024-06-28 DIAGNOSIS — F41.9 ANXIETY: ICD-10-CM

## 2024-06-28 PROCEDURE — 99395 PREV VISIT EST AGE 18-39: CPT | Performed by: INTERNAL MEDICINE

## 2024-06-28 RX ORDER — M-VIT,TX,IRON,MINS/CALC/FOLIC 27MG-0.4MG
1 TABLET ORAL DAILY
COMMUNITY

## 2024-06-28 SDOH — ECONOMIC STABILITY: FOOD INSECURITY: WITHIN THE PAST 12 MONTHS, THE FOOD YOU BOUGHT JUST DIDN'T LAST AND YOU DIDN'T HAVE MONEY TO GET MORE.: NEVER TRUE

## 2024-06-28 SDOH — ECONOMIC STABILITY: INCOME INSECURITY: HOW HARD IS IT FOR YOU TO PAY FOR THE VERY BASICS LIKE FOOD, HOUSING, MEDICAL CARE, AND HEATING?: NOT HARD AT ALL

## 2024-06-28 SDOH — ECONOMIC STABILITY: FOOD INSECURITY: WITHIN THE PAST 12 MONTHS, YOU WORRIED THAT YOUR FOOD WOULD RUN OUT BEFORE YOU GOT MONEY TO BUY MORE.: NEVER TRUE

## 2024-06-28 ASSESSMENT — PATIENT HEALTH QUESTIONNAIRE - PHQ9
SUM OF ALL RESPONSES TO PHQ QUESTIONS 1-9: 0
2. FEELING DOWN, DEPRESSED OR HOPELESS: NOT AT ALL
SUM OF ALL RESPONSES TO PHQ9 QUESTIONS 1 & 2: 0
1. LITTLE INTEREST OR PLEASURE IN DOING THINGS: NOT AT ALL
SUM OF ALL RESPONSES TO PHQ QUESTIONS 1-9: 0

## 2024-06-28 ASSESSMENT — ENCOUNTER SYMPTOMS
EYES NEGATIVE: 1
ABDOMINAL PAIN: 0
DIARRHEA: 0
CONSTIPATION: 0
NAUSEA: 0
VOMITING: 0
SHORTNESS OF BREATH: 0
COUGH: 0

## 2024-06-28 NOTE — PROGRESS NOTES
6/28/2024    Traci Moore is a 24 y.o. female who was seen in clinic today for a full physical.             Assessment & Plan:   Below is the assessment and plan developed based on review of pertinent history, physical exam, labs, studies, and medications.    1. Routine physical examination  2. Anxiety  Assessment & Plan:  Controlled with current regimen, plan to continue  3. Bilateral carpal tunnel syndrome  Assessment & Plan:  S/p release, Dr Ruiz  Still has some elbow issues, managing conservatively     Labs were WNL last year, will defer another year  Discussed intermittent disequilibrium, may be vestibular migraine, if worsening would refer to neuro    Return in about 6 months (around 12/28/2024) for chronic follow up.      Traci is here today for a full physical.      Additional concerns today: occasional dizziness at rest    Diet and exercise habits: balanced diet, walking doing some weights and workout classes      Depression Screen:  PHQ-9 Total Score: 0 (6/28/2024  8:32 AM)       Health Maintenance:     Health Maintenance   Topic Date Due    COVID-19 Vaccine (1) Never done    Pneumococcal 0-64 years Vaccine (1 of 2 - PCV) Never done    HPV vaccine (2 - 3-dose series) 01/01/2060 (Originally 4/8/2022)    Flu vaccine (Season Ended) 08/01/2024    Depression Screen  10/27/2024    Pap smear  01/01/2026    DTaP/Tdap/Td vaccine (8 - Td or Tdap) 06/23/2033    Hepatitis A vaccine  Completed    Hepatitis B vaccine  Completed    Hib vaccine  Completed    Polio vaccine  Completed    Varicella vaccine  Completed    Hepatitis C screen  Completed    HIV screen  Completed    Meningococcal (ACWY) vaccine  Aged Out       Immunization History   Administered Date(s) Administered    DTaP, DAPTACEL, (age 6w-6y), IM, 0.5mL 1998, 1998, 1998, 02/22/1999, 01/29/2003    HPV, GARDASIL 9, (age 9y-45y), IM, 0.5mL 03/11/2022    Hep A, HAVRIX, VAQTA, (age 12m-18y), IM, 0.5mL 06/17/2013, 05/05/2016    Hepatitis B

## 2024-06-28 NOTE — PROGRESS NOTES
Chief Complaint   Patient presents with    Annual Exam     Blood pressure 119/74, pulse 100, temperature 98 °F (36.7 °C), temperature source Oral, resp. rate 18, height 1.562 m (5' 1.5\"), weight 59.5 kg (131 lb 3.2 oz), last menstrual period 06/22/2024, SpO2 99 %.

## 2024-07-22 DIAGNOSIS — F41.9 ANXIETY: ICD-10-CM

## 2024-07-22 DIAGNOSIS — J45.20 MILD INTERMITTENT ASTHMA WITHOUT COMPLICATION: ICD-10-CM

## 2024-07-24 RX ORDER — BUPROPION HYDROCHLORIDE 150 MG/1
150 TABLET ORAL EVERY MORNING
Qty: 90 TABLET | Refills: 1 | Status: SHIPPED | OUTPATIENT
Start: 2024-07-24

## 2024-07-24 RX ORDER — MONTELUKAST SODIUM 5 MG/1
5 TABLET, CHEWABLE ORAL NIGHTLY
Qty: 90 TABLET | Refills: 1 | Status: SHIPPED | OUTPATIENT
Start: 2024-07-24

## 2024-07-24 RX ORDER — BUSPIRONE HYDROCHLORIDE 15 MG/1
15 TABLET ORAL 2 TIMES DAILY
Qty: 180 TABLET | Refills: 1 | Status: SHIPPED | OUTPATIENT
Start: 2024-07-24

## 2024-08-08 DIAGNOSIS — F41.9 ANXIETY: ICD-10-CM

## 2024-08-13 RX ORDER — BUPROPION HYDROCHLORIDE 150 MG/1
150 TABLET ORAL EVERY MORNING
Qty: 30 TABLET | OUTPATIENT
Start: 2024-08-13

## 2024-09-18 ENCOUNTER — PATIENT MESSAGE (OUTPATIENT)
Age: 24
End: 2024-09-18

## 2024-09-18 RX ORDER — BUPROPION HYDROCHLORIDE 75 MG/1
75 TABLET ORAL DAILY
Qty: 90 TABLET | Refills: 0 | Status: SHIPPED | OUTPATIENT
Start: 2024-09-18

## 2024-11-12 ENCOUNTER — E-VISIT (OUTPATIENT)
Age: 24
End: 2024-11-12
Payer: COMMERCIAL

## 2024-11-12 DIAGNOSIS — B37.9 CANDIDIASIS: Primary | ICD-10-CM

## 2024-11-12 PROCEDURE — 99421 OL DIG E/M SVC 5-10 MIN: CPT | Performed by: INTERNAL MEDICINE

## 2024-11-12 RX ORDER — FLUCONAZOLE 150 MG/1
TABLET ORAL
Qty: 2 TABLET | Refills: 0 | Status: SHIPPED | OUTPATIENT
Start: 2024-11-12

## 2024-11-12 NOTE — PROGRESS NOTES
E-Visit Note:    HPI: per patient questionnaire, this has been reviewed  EXAM: n/a    ----------------------------------  1. Candidiasis  -     fluconazole (DIFLUCAN) 150 MG tablet; Take one tablet once, then may repeat dose after 72 hours if symptoms persist, Disp-2 tablet, R-0Normal     Advice per mychart msg  ----------------------------------  The patient was advised to call if these symptoms worsen or fail to improve as anticipated.    5-10 minutes were spent on the digital evaluation and management of this patient.     Radha Dudley MD